# Patient Record
Sex: FEMALE | URBAN - METROPOLITAN AREA
[De-identification: names, ages, dates, MRNs, and addresses within clinical notes are randomized per-mention and may not be internally consistent; named-entity substitution may affect disease eponyms.]

---

## 2017-08-01 ENCOUNTER — OUTPATIENT (OUTPATIENT)
Dept: OUTPATIENT SERVICES | Facility: HOSPITAL | Age: 57
LOS: 1 days | End: 2017-08-01
Payer: MEDICAID

## 2017-08-03 DIAGNOSIS — R69 ILLNESS, UNSPECIFIED: ICD-10-CM

## 2017-09-01 PROCEDURE — G9001: CPT

## 2019-05-31 PROBLEM — Z00.00 ENCOUNTER FOR PREVENTIVE HEALTH EXAMINATION: Status: ACTIVE | Noted: 2019-05-31

## 2019-05-31 PROBLEM — Z00.00 ENCOUNTER FOR PREVENTIVE HEALTH EXAMINATION: Noted: 2019-05-31

## 2019-06-12 ENCOUNTER — APPOINTMENT (OUTPATIENT)
Dept: MAMMOGRAPHY | Facility: CLINIC | Age: 59
End: 2019-06-12

## 2019-06-12 ENCOUNTER — APPOINTMENT (OUTPATIENT)
Dept: RADIOLOGY | Facility: CLINIC | Age: 59
End: 2019-06-12
Payer: MEDICAID

## 2019-06-12 PROCEDURE — 77067 SCR MAMMO BI INCL CAD: CPT

## 2019-06-12 PROCEDURE — 77080 DXA BONE DENSITY AXIAL: CPT

## 2019-06-12 PROCEDURE — 77063 BREAST TOMOSYNTHESIS BI: CPT

## 2019-06-24 ENCOUNTER — APPOINTMENT (OUTPATIENT)
Dept: ORTHOPEDIC SURGERY | Facility: CLINIC | Age: 59
End: 2019-06-24
Payer: MEDICAID

## 2019-06-24 VITALS
HEIGHT: 62 IN | HEART RATE: 96 BPM | DIASTOLIC BLOOD PRESSURE: 82 MMHG | WEIGHT: 148 LBS | BODY MASS INDEX: 27.23 KG/M2 | SYSTOLIC BLOOD PRESSURE: 118 MMHG

## 2019-06-24 DIAGNOSIS — Z80.9 FAMILY HISTORY OF MALIGNANT NEOPLASM, UNSPECIFIED: ICD-10-CM

## 2019-06-24 DIAGNOSIS — Z86.39 PERSONAL HISTORY OF OTHER ENDOCRINE, NUTRITIONAL AND METABOLIC DISEASE: ICD-10-CM

## 2019-06-24 DIAGNOSIS — Z83.3 FAMILY HISTORY OF DIABETES MELLITUS: ICD-10-CM

## 2019-06-24 DIAGNOSIS — Z87.728 PERSONAL HISTORY OF OTHER SPECIFIED (CORRECTED) CONGENITAL MALFORMATIONS OF NERVOUS SYSTEM AND SENSE ORGANS: ICD-10-CM

## 2019-06-24 DIAGNOSIS — F17.200 NICOTINE DEPENDENCE, UNSPECIFIED, UNCOMPLICATED: ICD-10-CM

## 2019-06-24 DIAGNOSIS — Z87.39 PERSONAL HISTORY OF OTHER DISEASES OF THE MUSCULOSKELETAL SYSTEM AND CONNECTIVE TISSUE: ICD-10-CM

## 2019-06-24 PROCEDURE — 73030 X-RAY EXAM OF SHOULDER: CPT | Mod: 26,LT

## 2019-06-24 PROCEDURE — 99203 OFFICE O/P NEW LOW 30 MIN: CPT

## 2019-06-24 RX ORDER — OLANZAPINE 15 MG/1
15 TABLET, ORALLY DISINTEGRATING ORAL
Refills: 0 | Status: ACTIVE | COMMUNITY

## 2019-06-24 RX ORDER — HALOPERIDOL LACTATE 2 MG/ML
CONCENTRATE, ORAL ORAL
Refills: 0 | Status: ACTIVE | COMMUNITY

## 2019-06-24 RX ORDER — MENTHOL/CAMPHOR 0.5 %-0.5%
1000 LOTION (ML) TOPICAL
Refills: 0 | Status: ACTIVE | COMMUNITY

## 2019-06-24 RX ORDER — BENZTROPINE MESYLATE 0.5 MG
0.5 TABLET ORAL
Refills: 0 | Status: ACTIVE | COMMUNITY

## 2019-06-24 RX ORDER — LEVOTHYROXINE SODIUM 0.07 MG/1
75 TABLET ORAL
Refills: 0 | Status: ACTIVE | COMMUNITY

## 2019-06-24 RX ORDER — OMEPRAZOLE 20 MG/1
20 TABLET, DELAYED RELEASE ORAL
Refills: 0 | Status: ACTIVE | COMMUNITY

## 2019-06-24 RX ORDER — CETIRIZINE HCL 10 MG
10 TABLET ORAL
Refills: 0 | Status: ACTIVE | COMMUNITY

## 2019-06-24 NOTE — PHYSICAL EXAM
[de-identified] : - Constitutional: This is a female in no obvious distress.  \par - Psych: Patient is alert and oriented to person, place and time.  Patient has a normal mood and affect.\par - Cardiovascular: Normal pulses throughout the upper extremities.  No significant varicosities are noted in the upper extremities. \par - Neuro: Strength and sensation are intact throughout the upper extremities.  Patient has normal coordination.\par - Respiratory:  Patient exhibits no evidence of shortness of breath or difficulty breathing.\par - Skin: No rashes, lesions, or other abnormalities are noted in the upper extremities.\par \par ---\par \par Examination of her left shoulder demonstrates no focal swelling.  There is mild tenderness laterally in the region of the deltoid.  She has a positive Neer and Cornejo sign.  She has limitation of motion in all directions, consistent with adhesive capsulitis.  There is a negative drop arm test.  She is neurovascularly intact distally. [de-identified] : \par AP and Y radiographs of her left shoulder demonstrate no obvious fractures.  There is mild a.c. joint arthritis.

## 2019-06-24 NOTE — CONSULT LETTER
[Dear  ___] : Dear  [unfilled], [Consult Letter:] : I had the pleasure of evaluating your patient, [unfilled]. [Please see my note below.] : Please see my note below. [Consult Closing:] : Thank you very much for allowing me to participate in the care of this patient.  If you have any questions, please do not hesitate to contact me. [Sincerely,] : Sincerely, [FreeTextEntry3] : Gonzales Carrera M.D.\par Surgery of the Hand & Upper Extremity\par Orthopaedic Surgery\par Chief, Hand Service, [facility]\par Director, Hand Service, [facility]\par  of Orthopedic Surgery, [facility] [employer] [facility] at [facility] \par [facility]\par \par Email: [email or web address]\par Office Phone: [phone]\par \par
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
## 2019-07-15 ENCOUNTER — APPOINTMENT (OUTPATIENT)
Dept: ORTHOPEDIC SURGERY | Facility: CLINIC | Age: 59
End: 2019-07-15
Payer: MEDICAID

## 2019-07-15 PROCEDURE — 99214 OFFICE O/P EST MOD 30 MIN: CPT | Mod: 25

## 2019-07-15 PROCEDURE — 20610 DRAIN/INJ JOINT/BURSA W/O US: CPT | Mod: LT

## 2019-07-15 RX ORDER — IBUPROFEN 200 MG/1
200 TABLET ORAL 4 TIMES DAILY
Qty: 30 | Refills: 0 | Status: ACTIVE | COMMUNITY
Start: 2019-07-15 | End: 1900-01-01

## 2019-07-15 NOTE — HISTORY OF PRESENT ILLNESS
[FreeTextEntry1] : Followup regarding left shoulder.  See note from when she was seen in the office 3 weeks ago.  I ordered an MRI.  She comes in to review the results and discuss treatment recommendations.

## 2019-07-15 NOTE — PROCEDURE
[FreeTextEntry1] : -  After a discussion of risks and benefits, the patient agreed to proceed with a cortisone injection. \par -  Side injected: Left glenohumeral joint.\par -  Medications injected: 2 cc of 1% Lidocaine and 1 cc of 40mg of Depomedrol, using sterile technique.\par -  Patient tolerated the procedure well, without complications.\par -  Patient noted immediate improvement of the symptoms, secondary to the anesthetic effects of the injection.\par -  Patient was told that the pain may worsen for a day or 2, and should then begin to improve.  \par -  Instructions: The patient was instructed on the use of ice, anti-inflammatory agents or Tylenol, and activity modification.\par -  Follow-up: Within 4 weeks to assess response to the injection.

## 2019-07-15 NOTE — DISCUSSION/SUMMARY
[FreeTextEntry1] : I discussed the MRI results with her.  She has findings consistent with left shoulder adhesive capsulitis.  Further treatment options / recommendations were discussed.  At this time, she agreed to proceed with a cortisone injection.  In addition, she was given a referral for physical therapy.\par \par I had a discussion regarding today's visit, the diagnosis, and my treatment recommendations.  The patient has agreed to this plan of management and has expressed full understanding.  All questions were fully answered to the patient's satisfaction.\par \par I spent at least 25 minutes of face-to-face time with the patient.  Over 50% of this time was spent on counseling the patient on the above diagnosis, treatment plan and prognosis.

## 2019-07-15 NOTE — PHYSICAL EXAM
[de-identified] : - Constitutional: This is a female in no obvious distress.  \par - Psych: Patient is alert and oriented to person, place and time.  Patient has a normal mood and affect.\par - Cardiovascular: Normal pulses throughout the upper extremities.  No significant varicosities are noted in the upper extremities. \par - Neuro: Strength and sensation are intact throughout the upper extremities.  Patient has normal coordination.\par - Respiratory:  Patient exhibits no evidence of shortness of breath or difficulty breathing.\par - Skin: No rashes, lesions, or other abnormalities are noted in the upper extremities.\par \par ---\par \par Examination of her left shoulder demonstrates no focal swelling.  There is mild tenderness laterally in the region of the deltoid.  She has a positive Neer and Cornejo sign.  She has limitation of motion in all directions, consistent with adhesive capsulitis.  There is a negative drop arm test.  She is neurovascularly intact distally. [de-identified] : \par I reviewed the MRI of her left shoulder.  This demonstrated mild rotator cuff tendinosis without tear.  It was evidence consistent with adhesive capsulitis.  There was biceps tendinosis and tenosynovitis.  There was a small glenohumeral joint effusion.\par \par Previous AP and Y radiographs of her left shoulder demonstrated no obvious fractures.  There is mild a.c. joint arthritis.

## 2019-07-19 ENCOUNTER — OUTPATIENT (OUTPATIENT)
Dept: OUTPATIENT SERVICES | Facility: HOSPITAL | Age: 59
LOS: 1 days | Discharge: HOME | End: 2019-07-19

## 2019-09-23 ENCOUNTER — APPOINTMENT (OUTPATIENT)
Dept: ORTHOPEDIC SURGERY | Facility: CLINIC | Age: 59
End: 2019-09-23
Payer: MEDICAID

## 2019-09-23 VITALS — HEIGHT: 62 IN | WEIGHT: 148 LBS | BODY MASS INDEX: 27.23 KG/M2

## 2019-09-23 PROCEDURE — 99214 OFFICE O/P EST MOD 30 MIN: CPT

## 2019-09-23 NOTE — DISCUSSION/SUMMARY
[FreeTextEntry1] : Further treatment recommendations / options were discussed.  At this time, I recommended continued physical therapy and a home exercise program.  She was given a new prescription for physical therapy.  She will follow-up in 8 weeks to assess her progress.\par \par I had a discussion regarding today's visit, the diagnosis, and my treatment recommendations.  The patient has agreed to the above plan of management and has expressed full understanding.  All questions were fully answered to the patient's satisfaction.\par \par I spent at least 25 minutes of face-to-face time with the patient.  Over 50% of this time was spent on counseling the patient on the above diagnosis, treatment plan and prognosis.

## 2019-09-23 NOTE — PHYSICAL EXAM
[de-identified] : - Constitutional: This is a female in no obvious distress.  \par - Psych: Patient is alert and oriented to person, place and time.  Patient has a normal mood and affect.\par - Cardiovascular: Normal pulses throughout the upper extremities.  No significant varicosities are noted in the upper extremities. \par - Neuro: Strength and sensation are intact throughout the upper extremities.  Patient has normal coordination.\par - Respiratory:  Patient exhibits no evidence of shortness of breath or difficulty breathing.\par - Skin: No rashes, lesions, or other abnormalities are noted in the upper extremities.\par \par ---\par \par Examination of her left shoulder demonstrates no focal swelling.  There is decreased pain with motion.  She has improved forward elevation.  She continues to have some pain and limitation of abduction and internal rotation.  She remains neurovascularly intact distally. [de-identified] : \par An MRI of her left shoulder demonstrated mild rotator cuff tendinosis without tear.  There was evidence consistent with adhesive capsulitis.  There was biceps tendinosis and tenosynovitis.  There was a small glenohumeral joint effusion.\par \par Previous AP and Y radiographs of her left shoulder demonstrated no obvious fractures.  There is mild a.c. joint arthritis.

## 2019-10-21 ENCOUNTER — CHART COPY (OUTPATIENT)
Age: 59
End: 2019-10-21

## 2019-11-07 PROBLEM — S43.402A SPRAIN OF LEFT SHOULDER: Status: ACTIVE | Noted: 2019-06-24

## 2019-11-07 PROBLEM — M75.02 ADHESIVE CAPSULITIS OF LEFT SHOULDER: Status: ACTIVE | Noted: 2019-06-24

## 2019-11-11 ENCOUNTER — APPOINTMENT (OUTPATIENT)
Dept: ORTHOPEDIC SURGERY | Facility: CLINIC | Age: 59
End: 2019-11-11
Payer: MEDICAID

## 2019-11-11 VITALS — WEIGHT: 148 LBS | HEIGHT: 62 IN | BODY MASS INDEX: 27.23 KG/M2

## 2019-11-11 DIAGNOSIS — M75.02 ADHESIVE CAPSULITIS OF LEFT SHOULDER: ICD-10-CM

## 2019-11-11 DIAGNOSIS — S43.402A UNSPECIFIED SPRAIN OF LEFT SHOULDER JOINT, INITIAL ENCOUNTER: ICD-10-CM

## 2019-11-11 PROCEDURE — 99213 OFFICE O/P EST LOW 20 MIN: CPT

## 2019-11-11 NOTE — DISCUSSION/SUMMARY
[FreeTextEntry1] : Further treatment recommendations / options were discussed.  At this time, I recommended continued exercise on her own.  She will follow-up according to her symptoms.\par \par I had a discussion regarding today's visit, the diagnosis, and my treatment recommendations.  The patient has agreed to the above plan of management and has expressed full understanding.  All questions were fully answered to the patient's satisfaction.\par \par I spent at least 25 minutes of face-to-face time with the patient.  Over 50% of this time was spent on counseling the patient on the above diagnosis, treatment plan and prognosis.

## 2019-11-11 NOTE — PHYSICAL EXAM
[de-identified] : \par An MRI of her left shoulder demonstrated mild rotator cuff tendinosis without tear.  There was evidence consistent with adhesive capsulitis.  There was biceps tendinosis and tenosynovitis.  There was a small glenohumeral joint effusion.\par \par Previous AP and Y radiographs of her left shoulder demonstrated no obvious fractures.  There is mild a.c. joint arthritis. [de-identified] : - Constitutional: This is a female in no obvious distress.  \par - Psych: Patient is alert and oriented to person, place and time.  Patient has a normal mood and affect.\par - Cardiovascular: Normal pulses throughout the upper extremities.  No significant varicosities are noted in the upper extremities. \par - Neuro: Strength and sensation are intact throughout the upper extremities.  Patient has normal coordination.\par - Respiratory:  Patient exhibits no evidence of shortness of breath or difficulty breathing.\par - Skin: No rashes, lesions, or other abnormalities are noted in the upper extremities.\par \par ---\par \par Examination of her left shoulder demonstrates no focal swelling.  There is minimal residual pain with motion.  She has regained full motion.   She remains neurovascularly intact distally.

## 2019-11-11 NOTE — HISTORY OF PRESENT ILLNESS
[FreeTextEntry1] : Almost 4 months status post left shoulder glenohumeral joint cortisone injection #1, for adhesive capsulitis.  y. \par \par She returns today in follow-up.  She is doing well.  She finished up physical therapy.  She states that she is 90% improved with mild residual pain.\par

## 2019-11-27 NOTE — HISTORY OF PRESENT ILLNESS
[facility]  [address] ([address]), [address] [place]  Phone: [phone] Fax: [phone]    Progress Note    Name: Michele Bustamante   : 1963   MD: Cruz Colin DO       Treatment Diagnosis: Left knee pain [M25.562]  Start of Care: 10/31/19    Visits from Start of Care: 8  Missed Visits: 2    Summary of Care: Therapy has included therex, manual techniques, and modalities for strength, ROM, and pain/edema management for a left lateral meniscus tear. Assessment / Recommendations: The patient is progressing well with improved strength and mobility with her known left lateral meniscus tear. She reports less pain, but it is still buckling 1-2x/day (less often within the past few days). She will benefit from continued therapy to progress weightbearing strengthening, stability, and overall mobility to eventually an advanced HEP. Short Term Goals: To be accomplished in 2 treatments:                         4.) The patient will be independent with their HEP consistently for at least one week. - MET  Long Term Goals: To be accomplished in 16 treatments:                         9.) The patient will have an average of 3/10 pain with daily activities. - Frequently MET                         9.) The patient will be able to ambulate for at least 20 min without having to change positions due to pain. - Progressing                         9.) The patient will improve their FOTO score from 33 to at least 40 to show improvements in functional mobility.- Progressing    Other: Continue 2x/wk for at least 8 more treatments      Karen Bardales, PT 2019     ________________________________________________________________________  NOTE TO PHYSICIAN:  Please complete the following and fax to: [facility]: [phone]  . Retain this original for your records.   If you are unable to process this request in 24 hours, please [FreeTextEntry1] : Greater than 2 months status post left shoulder glenohumeral joint cortisone injection #1, for adhesive capsulitis.  She also is in physical therapy. \par \par She returns today in follow-up.  She is much improved with some residual stiffness.  Her pain is improved.\par  contact our office.        ____ I have read the above report and request that my patient continue therapy with the following changes/special instructions:  ____ I have read the above report and request that my patient be discharged from therapy    Physician's Signature:_________________ Date:___________Time:__________

## 2020-08-12 ENCOUNTER — APPOINTMENT (OUTPATIENT)
Dept: DERMATOLOGY | Facility: CLINIC | Age: 60
End: 2020-08-12

## 2021-02-09 ENCOUNTER — APPOINTMENT (OUTPATIENT)
Dept: MAMMOGRAPHY | Facility: CLINIC | Age: 61
End: 2021-02-09
Payer: MEDICAID

## 2021-02-09 PROCEDURE — 77067 SCR MAMMO BI INCL CAD: CPT

## 2021-02-09 PROCEDURE — 77063 BREAST TOMOSYNTHESIS BI: CPT

## 2022-11-17 ENCOUNTER — APPOINTMENT (OUTPATIENT)
Dept: MAMMOGRAPHY | Facility: CLINIC | Age: 62
End: 2022-11-17

## 2022-11-17 ENCOUNTER — APPOINTMENT (OUTPATIENT)
Dept: ULTRASOUND IMAGING | Facility: CLINIC | Age: 62
End: 2022-11-17

## 2022-11-17 ENCOUNTER — APPOINTMENT (OUTPATIENT)
Dept: RADIOLOGY | Facility: CLINIC | Age: 62
End: 2022-11-17

## 2022-11-17 PROCEDURE — 76641 ULTRASOUND BREAST COMPLETE: CPT | Mod: 50

## 2022-11-17 PROCEDURE — 77080 DXA BONE DENSITY AXIAL: CPT

## 2022-11-17 PROCEDURE — 77067 SCR MAMMO BI INCL CAD: CPT

## 2022-11-17 PROCEDURE — 77063 BREAST TOMOSYNTHESIS BI: CPT

## 2024-01-23 ENCOUNTER — APPOINTMENT (OUTPATIENT)
Dept: MAMMOGRAPHY | Facility: CLINIC | Age: 64
End: 2024-01-23
Payer: MEDICAID

## 2024-01-23 PROCEDURE — 77063 BREAST TOMOSYNTHESIS BI: CPT

## 2024-01-23 PROCEDURE — 77067 SCR MAMMO BI INCL CAD: CPT

## 2025-02-07 ENCOUNTER — APPOINTMENT (OUTPATIENT)
Dept: MAMMOGRAPHY | Facility: CLINIC | Age: 65
End: 2025-02-07
Payer: MEDICAID

## 2025-02-07 PROCEDURE — 77067 SCR MAMMO BI INCL CAD: CPT

## 2025-02-07 PROCEDURE — 77063 BREAST TOMOSYNTHESIS BI: CPT

## 2025-02-14 ENCOUNTER — APPOINTMENT (OUTPATIENT)
Dept: CT IMAGING | Facility: CLINIC | Age: 65
End: 2025-02-14